# Patient Record
Sex: MALE | Race: WHITE | NOT HISPANIC OR LATINO | URBAN - METROPOLITAN AREA
[De-identification: names, ages, dates, MRNs, and addresses within clinical notes are randomized per-mention and may not be internally consistent; named-entity substitution may affect disease eponyms.]

---

## 2021-03-20 ENCOUNTER — OUTPATIENT (OUTPATIENT)
Dept: OUTPATIENT SERVICES | Facility: HOSPITAL | Age: 41
LOS: 1 days | End: 2021-03-20

## 2021-03-20 ENCOUNTER — EMERGENCY (EMERGENCY)
Facility: HOSPITAL | Age: 41
LOS: 1 days | End: 2021-03-20
Payer: MEDICAID

## 2021-03-20 PROCEDURE — 93010 ELECTROCARDIOGRAM REPORT: CPT

## 2021-03-20 PROCEDURE — 99285 EMERGENCY DEPT VISIT HI MDM: CPT

## 2021-03-20 PROCEDURE — 71045 X-RAY EXAM CHEST 1 VIEW: CPT | Mod: 26

## 2021-03-20 PROCEDURE — 99234 HOSP IP/OBS SM DT SF/LOW 45: CPT

## 2021-03-20 PROCEDURE — 93975 VASCULAR STUDY: CPT | Mod: 26

## 2021-03-21 ENCOUNTER — OUTPATIENT (OUTPATIENT)
Dept: OUTPATIENT SERVICES | Facility: HOSPITAL | Age: 41
LOS: 1 days | End: 2021-03-21

## 2021-04-15 PROBLEM — Z00.00 ENCOUNTER FOR PREVENTIVE HEALTH EXAMINATION: Status: ACTIVE | Noted: 2021-04-15

## 2021-04-16 ENCOUNTER — APPOINTMENT (OUTPATIENT)
Dept: CARDIOLOGY | Facility: CLINIC | Age: 41
End: 2021-04-16
Payer: MEDICAID

## 2021-04-16 VITALS
OXYGEN SATURATION: 99 % | HEIGHT: 72 IN | SYSTOLIC BLOOD PRESSURE: 110 MMHG | BODY MASS INDEX: 42.66 KG/M2 | WEIGHT: 315 LBS | HEART RATE: 82 BPM | TEMPERATURE: 97.3 F | DIASTOLIC BLOOD PRESSURE: 82 MMHG

## 2021-04-16 DIAGNOSIS — Z82.49 FAMILY HISTORY OF ISCHEMIC HEART DISEASE AND OTHER DISEASES OF THE CIRCULATORY SYSTEM: ICD-10-CM

## 2021-04-16 DIAGNOSIS — Z87.898 PERSONAL HISTORY OF OTHER SPECIFIED CONDITIONS: ICD-10-CM

## 2021-04-16 DIAGNOSIS — Z78.9 OTHER SPECIFIED HEALTH STATUS: ICD-10-CM

## 2021-04-16 DIAGNOSIS — Z87.891 PERSONAL HISTORY OF NICOTINE DEPENDENCE: ICD-10-CM

## 2021-04-16 PROCEDURE — 99204 OFFICE O/P NEW MOD 45 MIN: CPT

## 2021-04-16 PROCEDURE — 99072 ADDL SUPL MATRL&STAF TM PHE: CPT

## 2021-04-17 PROBLEM — Z87.898 HISTORY OF MORBID OBESITY: Status: RESOLVED | Noted: 2021-04-17 | Resolved: 2021-04-17

## 2021-05-10 ENCOUNTER — APPOINTMENT (OUTPATIENT)
Dept: CARDIOLOGY | Facility: CLINIC | Age: 41
End: 2021-05-10
Payer: MEDICAID

## 2021-05-10 PROCEDURE — 99072 ADDL SUPL MATRL&STAF TM PHE: CPT

## 2021-05-10 PROCEDURE — 93306 TTE W/DOPPLER COMPLETE: CPT

## 2021-05-12 ENCOUNTER — APPOINTMENT (OUTPATIENT)
Dept: CARDIOLOGY | Facility: CLINIC | Age: 41
End: 2021-05-12
Payer: MEDICAID

## 2021-05-12 PROCEDURE — 93015 CV STRESS TEST SUPVJ I&R: CPT

## 2021-05-12 PROCEDURE — 99072 ADDL SUPL MATRL&STAF TM PHE: CPT

## 2021-11-05 ENCOUNTER — NON-APPOINTMENT (OUTPATIENT)
Age: 41
End: 2021-11-05

## 2021-11-05 ENCOUNTER — APPOINTMENT (OUTPATIENT)
Dept: CARDIOLOGY | Facility: CLINIC | Age: 41
End: 2021-11-05
Payer: MEDICAID

## 2021-11-05 VITALS
BODY MASS INDEX: 42.66 KG/M2 | SYSTOLIC BLOOD PRESSURE: 146 MMHG | TEMPERATURE: 97.1 F | HEART RATE: 74 BPM | WEIGHT: 315 LBS | OXYGEN SATURATION: 97 % | HEIGHT: 72 IN | DIASTOLIC BLOOD PRESSURE: 98 MMHG

## 2021-11-05 PROCEDURE — 99214 OFFICE O/P EST MOD 30 MIN: CPT

## 2021-11-05 PROCEDURE — 93000 ELECTROCARDIOGRAM COMPLETE: CPT

## 2021-11-06 ENCOUNTER — APPOINTMENT (OUTPATIENT)
Dept: ULTRASOUND IMAGING | Facility: CLINIC | Age: 41
End: 2021-11-06
Payer: MEDICAID

## 2021-11-06 PROCEDURE — 76770 US EXAM ABDO BACK WALL COMP: CPT

## 2022-02-20 ENCOUNTER — NON-APPOINTMENT (OUTPATIENT)
Age: 42
End: 2022-02-20

## 2022-05-08 ENCOUNTER — RX CHANGE (OUTPATIENT)
Age: 42
End: 2022-05-08

## 2022-05-10 ENCOUNTER — NON-APPOINTMENT (OUTPATIENT)
Age: 42
End: 2022-05-10

## 2022-05-10 ENCOUNTER — APPOINTMENT (OUTPATIENT)
Dept: CARDIOLOGY | Facility: CLINIC | Age: 42
End: 2022-05-10
Payer: MEDICAID

## 2022-05-10 VITALS
WEIGHT: 315 LBS | DIASTOLIC BLOOD PRESSURE: 90 MMHG | BODY MASS INDEX: 54.25 KG/M2 | SYSTOLIC BLOOD PRESSURE: 134 MMHG | TEMPERATURE: 97.6 F | HEART RATE: 87 BPM | OXYGEN SATURATION: 96 %

## 2022-05-10 PROCEDURE — 93000 ELECTROCARDIOGRAM COMPLETE: CPT

## 2022-05-10 PROCEDURE — 99214 OFFICE O/P EST MOD 30 MIN: CPT

## 2022-05-10 NOTE — CARDIOLOGY SUMMARY
[de-identified] : 11/5/21: Poor R wave progression\par 5/10/22: SR, WNL [de-identified] : 5/21: Danny 5:52, SVT at peak HR, no ischemia.  [de-identified] : 5/10/21: E 60%, minimal MR, mild LVH, mild TR, minimal PI, normal PASP

## 2022-05-10 NOTE — DISCUSSION/SUMMARY
[With Me] : with me [___ Month(s)] : in [unfilled] month(s) [FreeTextEntry1] : 42-year-old male with past medical history as above presents for routine follow-up. \par \par 1.  Hypertension-continue valsartan 40 mg p.o. daily and Toprol XL 25 mg p.o. daily.  I offered him EP consultation for consideration of ablation of the SVT, he would like to wait at this time.\par 2.  Hyperlipidemia- on last check, advised continue lifestyle modifications with diet and weight loss.\par 3.  I counseled him on the safety and efficacy of the COVID-19 vaccine.  He is hesitant to get it.\par \par RTC 6 months

## 2022-05-10 NOTE — HISTORY OF PRESENT ILLNESS
[FreeTextEntry1] : 42-year-old male with past medical history of hypertension, hyperlipidemia and obesity presents for evaluation regarding recent hospitalization for uncontrolled hypertension and chest pain.  He is a former smoker and denies toxic habits.  He is a poor diet and frequently eats very fatty foods.  He had noticed that for several days prior to hospital visit that he had been having undulating pressures that were spiking.  He had seen his primary care doctor in [facility] with similar complaints and had lab work done.  Due to the chest pains he eventually came to the [facility] and was evaluated.  BPs were seemingly controlled with valsartan 160 mg p.o. daily.  He ruled out for acute MI and had no significant renal artery stenosis on an ultrasound.  Chest pain is centrally located, rated 3-5 out of 10, nonradiating without clear exacerbating or alleviating factors.\par \par 5/10/22:\par Feeling well.  Intermittently taking the metoprolol as needed for palps.  Denies chest pains, SOB and LE edema.

## 2022-05-10 NOTE — PHYSICAL EXAM

## 2022-06-06 ENCOUNTER — RX CHANGE (OUTPATIENT)
Age: 42
End: 2022-06-06

## 2022-06-27 ENCOUNTER — NON-APPOINTMENT (OUTPATIENT)
Age: 42
End: 2022-06-27

## 2022-07-11 ENCOUNTER — OUTPATIENT (OUTPATIENT)
Dept: OUTPATIENT SERVICES | Facility: HOSPITAL | Age: 42
LOS: 1 days | End: 2022-07-11

## 2022-07-11 DIAGNOSIS — J18.8 OTHER PNEUMONIA, UNSPECIFIED ORGANISM: ICD-10-CM

## 2022-07-11 PROCEDURE — 71046 X-RAY EXAM CHEST 2 VIEWS: CPT | Mod: 26

## 2022-07-20 ENCOUNTER — APPOINTMENT (OUTPATIENT)
Dept: ULTRASOUND IMAGING | Facility: CLINIC | Age: 42
End: 2022-07-20

## 2022-07-20 PROCEDURE — 76770 US EXAM ABDO BACK WALL COMP: CPT

## 2022-07-21 ENCOUNTER — APPOINTMENT (OUTPATIENT)
Dept: RADIOLOGY | Facility: CLINIC | Age: 42
End: 2022-07-21

## 2022-07-22 ENCOUNTER — RX CHANGE (OUTPATIENT)
Age: 42
End: 2022-07-22

## 2022-09-22 ENCOUNTER — RX CHANGE (OUTPATIENT)
Age: 42
End: 2022-09-22

## 2022-09-23 ENCOUNTER — RX CHANGE (OUTPATIENT)
Age: 42
End: 2022-09-23

## 2022-11-05 ENCOUNTER — RX CHANGE (OUTPATIENT)
Age: 42
End: 2022-11-05

## 2022-11-14 ENCOUNTER — APPOINTMENT (OUTPATIENT)
Dept: CARDIOLOGY | Facility: CLINIC | Age: 42
End: 2022-11-14

## 2023-02-21 ENCOUNTER — APPOINTMENT (OUTPATIENT)
Dept: CARDIOLOGY | Facility: CLINIC | Age: 43
End: 2023-02-21
Payer: MEDICAID

## 2023-02-21 ENCOUNTER — NON-APPOINTMENT (OUTPATIENT)
Age: 43
End: 2023-02-21

## 2023-02-21 VITALS
SYSTOLIC BLOOD PRESSURE: 164 MMHG | BODY MASS INDEX: 42.66 KG/M2 | TEMPERATURE: 97.1 F | WEIGHT: 315 LBS | DIASTOLIC BLOOD PRESSURE: 86 MMHG | HEART RATE: 84 BPM | OXYGEN SATURATION: 98 % | HEIGHT: 72 IN

## 2023-02-21 PROCEDURE — 99214 OFFICE O/P EST MOD 30 MIN: CPT | Mod: 25

## 2023-02-21 PROCEDURE — 93000 ELECTROCARDIOGRAM COMPLETE: CPT

## 2023-02-27 ENCOUNTER — OFFICE (OUTPATIENT)
Dept: URBAN - METROPOLITAN AREA CLINIC 38 | Facility: CLINIC | Age: 43
Setting detail: OPHTHALMOLOGY
End: 2023-02-27
Payer: MEDICAID

## 2023-02-27 DIAGNOSIS — H35.033: ICD-10-CM

## 2023-02-27 DIAGNOSIS — H01.004: ICD-10-CM

## 2023-02-27 DIAGNOSIS — H43.813: ICD-10-CM

## 2023-02-27 DIAGNOSIS — H11.153: ICD-10-CM

## 2023-02-27 DIAGNOSIS — H10.89: ICD-10-CM

## 2023-02-27 DIAGNOSIS — H01.001: ICD-10-CM

## 2023-02-27 DIAGNOSIS — H35.361: ICD-10-CM

## 2023-02-27 DIAGNOSIS — H25.13: ICD-10-CM

## 2023-02-27 PROCEDURE — 92002 INTRM OPH EXAM NEW PATIENT: CPT | Performed by: OPHTHALMOLOGY

## 2023-02-27 ASSESSMENT — LID EXAM ASSESSMENTS
OS_BLEPHARITIS: 2+ 3+
OD_BLEPHARITIS: 2+ 3+
OD_COMMENTS: WITH DEMODEX
OS_COMMENTS: WITH DEMODEX

## 2023-02-27 ASSESSMENT — KERATOMETRY
OD_K1POWER_DIOPTERS: 39.75
OS_AXISANGLE_DEGREES: 079
OS_K2POWER_DIOPTERS: 41.50
METHOD_AUTO_MANUAL: AUTO
OS_K1POWER_DIOPTERS: 40.25
OD_AXISANGLE_DEGREES: 111
OD_K2POWER_DIOPTERS: 42.50

## 2023-02-27 ASSESSMENT — REFRACTION_AUTOREFRACTION
OD_CYLINDER: -2.75
OS_CYLINDER: -0.50
OS_SPHERE: PLANO
OS_AXIS: 162
OD_AXIS: 029
OD_SPHERE: -0.25

## 2023-02-27 ASSESSMENT — TEAR BREAK UP TIME (TBUT)
OD_TBUT: 2-3 SECS
OS_TBUT: 2-3 SECS

## 2023-02-27 ASSESSMENT — CONFRONTATIONAL VISUAL FIELD TEST (CVF)
OS_FINDINGS: FULL
OD_FINDINGS: FULL

## 2023-02-27 ASSESSMENT — SPHEQUIV_DERIVED: OD_SPHEQUIV: -1.625

## 2023-02-27 ASSESSMENT — VISUAL ACUITY
OS_BCVA: 20/20-2
OD_BCVA: 20/20-2

## 2023-02-27 ASSESSMENT — AXIALLENGTH_DERIVED: OD_AL: 25.2

## 2023-03-14 ENCOUNTER — OFFICE (OUTPATIENT)
Dept: URBAN - METROPOLITAN AREA CLINIC 38 | Facility: CLINIC | Age: 43
Setting detail: OPHTHALMOLOGY
End: 2023-03-14
Payer: MEDICAID

## 2023-03-14 ENCOUNTER — RX ONLY (RX ONLY)
Age: 43
End: 2023-03-14

## 2023-03-14 DIAGNOSIS — H04.559: ICD-10-CM

## 2023-03-14 DIAGNOSIS — H16.223: ICD-10-CM

## 2023-03-14 DIAGNOSIS — H25.13: ICD-10-CM

## 2023-03-14 DIAGNOSIS — H01.001: ICD-10-CM

## 2023-03-14 DIAGNOSIS — H10.89: ICD-10-CM

## 2023-03-14 DIAGNOSIS — H01.004: ICD-10-CM

## 2023-03-14 DIAGNOSIS — H52.4: ICD-10-CM

## 2023-03-14 PROCEDURE — 99213 OFFICE O/P EST LOW 20 MIN: CPT | Performed by: OPHTHALMOLOGY

## 2023-03-14 PROCEDURE — 92015 DETERMINE REFRACTIVE STATE: CPT | Performed by: OPHTHALMOLOGY

## 2023-03-14 ASSESSMENT — KERATOMETRY
OS_AXISANGLE_DEGREES: 079
METHOD_AUTO_MANUAL: AUTO
OD_AXISANGLE_DEGREES: 116
OD_K2POWER_DIOPTERS: 42.25
OD_K1POWER_DIOPTERS: 39.75
OS_K1POWER_DIOPTERS: 40.25
OS_K2POWER_DIOPTERS: 41.50

## 2023-03-14 ASSESSMENT — LID EXAM ASSESSMENTS
OS_BLEPHARITIS: 1+
OD_COMMENTS: WITH DEMODEX
OD_BLEPHARITIS: 1+
OS_COMMENTS: WITH DEMODEX

## 2023-03-14 ASSESSMENT — REFRACTION_MANIFEST
OD_VA1: 20/25-2
OS_VA1: 20/20-2
OD_AXIS: 030
OS_ADD: +1.25
OU_VA: 20/20-2
OD_SPHERE: PLANO
OD_AXIS: 030
OS_SPHERE: PLANO
OS_SPHERE: PLANO
OD_CYLINDER: -2.50
OD_SPHERE: PLANO
OS_VA1: 20/20-2
OD_ADD: +1.25
OU_VA: 20/20-2
OD_VA1: 20/25-2
OD_CYLINDER: -2.50

## 2023-03-14 ASSESSMENT — REFRACTION_AUTOREFRACTION
OD_CYLINDER: -2.50
OS_SPHERE: PLANO
OD_AXIS: 033
OS_CYLINDER: -0.75
OD_SPHERE: -0.25
OS_AXIS: 165

## 2023-03-14 ASSESSMENT — TEAR BREAK UP TIME (TBUT)
OS_TBUT: 2-3 SECS
OD_TBUT: 2-3 SECS

## 2023-03-14 ASSESSMENT — CONFRONTATIONAL VISUAL FIELD TEST (CVF)
OS_FINDINGS: FULL
OD_FINDINGS: FULL

## 2023-03-14 ASSESSMENT — AXIALLENGTH_DERIVED: OD_AL: 25.1968

## 2023-03-14 ASSESSMENT — VISUAL ACUITY
OD_BCVA: 20/20-2
OS_BCVA: 20/60+2

## 2023-03-14 ASSESSMENT — TONOMETRY
OD_IOP_MMHG: 18
OS_IOP_MMHG: 17

## 2023-03-14 ASSESSMENT — SPHEQUIV_DERIVED: OD_SPHEQUIV: -1.5

## 2023-08-26 ENCOUNTER — APPOINTMENT (OUTPATIENT)
Dept: ULTRASOUND IMAGING | Facility: CLINIC | Age: 43
End: 2023-08-26
Payer: MEDICAID

## 2023-08-26 PROCEDURE — 93970 EXTREMITY STUDY: CPT

## 2023-08-26 PROCEDURE — 76775 US EXAM ABDO BACK WALL LIM: CPT

## 2023-08-28 ENCOUNTER — APPOINTMENT (OUTPATIENT)
Dept: RADIOLOGY | Facility: CLINIC | Age: 43
End: 2023-08-28
Payer: MEDICAID

## 2023-08-28 ENCOUNTER — NON-APPOINTMENT (OUTPATIENT)
Age: 43
End: 2023-08-28

## 2023-08-28 ENCOUNTER — APPOINTMENT (OUTPATIENT)
Dept: CARDIOLOGY | Facility: CLINIC | Age: 43
End: 2023-08-28
Payer: MEDICAID

## 2023-08-28 VITALS
HEART RATE: 92 BPM | OXYGEN SATURATION: 97 % | DIASTOLIC BLOOD PRESSURE: 100 MMHG | SYSTOLIC BLOOD PRESSURE: 150 MMHG | WEIGHT: 315 LBS | BODY MASS INDEX: 59.68 KG/M2

## 2023-08-28 DIAGNOSIS — R07.9 CHEST PAIN, UNSPECIFIED: ICD-10-CM

## 2023-08-28 PROCEDURE — 73560 X-RAY EXAM OF KNEE 1 OR 2: CPT | Mod: 50

## 2023-08-28 PROCEDURE — 93000 ELECTROCARDIOGRAM COMPLETE: CPT

## 2023-08-28 PROCEDURE — 99214 OFFICE O/P EST MOD 30 MIN: CPT | Mod: 25

## 2023-08-28 NOTE — DISCUSSION/SUMMARY
[With Me] : with me [___ Month(s)] : in [unfilled] month(s) [EKG obtained to assist in diagnosis and management of assessed problem(s)] : EKG obtained to assist in diagnosis and management of assessed problem(s) [FreeTextEntry1] : 43-year-old male with past medical history as above presents for routine follow-up.   1.  Hypertension-continue valsartan 40 mg p.o. daily.  I stressed compliance with medications. 2.  Hyperlipidemia-'s on last check, advised continue lifestyle modifications with diet and weight loss.  Refusing statin at this point.   RTC 6 months

## 2023-08-28 NOTE — CARDIOLOGY SUMMARY
[de-identified] : 11/5/21: Poor R wave progression 5/10/22: SR, WNL 2/21/2023: Sinus rhythm, poor R progression, otherwise unchanged from prior 8/28/23: SR, poor R wave progression. [de-identified] : 5/21: Danny 5:52, SVT at peak HR, no ischemia.  [de-identified] : 5/10/21: EF 60%, minimal MR, mild LVH, mild TR, minimal PI, normal PASP

## 2023-08-28 NOTE — PHYSICAL EXAM
[Well Developed] : well developed [Well Nourished] : well nourished [No Acute Distress] : no acute distress [Normal Conjunctiva] : normal conjunctiva [Normal Venous Pressure] : normal venous pressure [No Carotid Bruit] : no carotid bruit [Normal S1, S2] : normal S1, S2 [No Murmur] : no murmur [No Rub] : no rub [No Gallop] : no gallop [Clear Lung Fields] : clear lung fields [Good Air Entry] : good air entry [No Respiratory Distress] : no respiratory distress  [Soft] : abdomen soft [Non Tender] : non-tender [No Masses/organomegaly] : no masses/organomegaly [Normal Bowel Sounds] : normal bowel sounds [Normal Gait] : normal gait [No Edema] : no edema [No Cyanosis] : no cyanosis [No Clubbing] : no clubbing [No Varicosities] : no varicosities [No Skin Lesions] : no skin lesions [No Rash] : no rash [Moves all extremities] : moves all extremities [No Focal Deficits] : no focal deficits [Normal Speech] : normal speech [Alert and Oriented] : alert and oriented [Normal memory] : normal memory [de-identified] : Obese

## 2023-08-28 NOTE — HISTORY OF PRESENT ILLNESS
[FreeTextEntry1] : 42-year-old male with past medical history of hypertension, hyperlipidemia and obesity presents for evaluation regarding recent hospitalization for uncontrolled hypertension and chest pain.  He is a former smoker and denies toxic habits.  He is a poor diet and frequently eats very fatty foods.  He had noticed that for several days prior to hospital visit that he had been having undulating pressures that were spiking.  He had seen his primary care doctor in [facility] with similar complaints and had lab work done.  Due to the chest pains he eventually came to the [place] ER and was evaluated.  BPs were seemingly controlled with valsartan 160 mg p.o. daily.  He ruled out for acute MI and had no significant renal artery stenosis on an ultrasound.  Chest pain is centrally located, rated 3-5 out of 10, nonradiating without clear exacerbating or alleviating factors.  8/28/23: Has been non-compliant with BP meds as well as a 40lb weight gain.

## 2023-09-20 ENCOUNTER — NON-APPOINTMENT (OUTPATIENT)
Age: 43
End: 2023-09-20

## 2024-01-22 ENCOUNTER — APPOINTMENT (OUTPATIENT)
Dept: RADIOLOGY | Facility: CLINIC | Age: 44
End: 2024-01-22
Payer: MEDICAID

## 2024-01-22 PROCEDURE — 71046 X-RAY EXAM CHEST 2 VIEWS: CPT

## 2024-03-18 ENCOUNTER — NON-APPOINTMENT (OUTPATIENT)
Age: 44
End: 2024-03-18

## 2024-03-18 ENCOUNTER — APPOINTMENT (OUTPATIENT)
Dept: CARDIOLOGY | Facility: CLINIC | Age: 44
End: 2024-03-18
Payer: MEDICAID

## 2024-03-18 VITALS
DIASTOLIC BLOOD PRESSURE: 100 MMHG | BODY MASS INDEX: 61.57 KG/M2 | WEIGHT: 315 LBS | HEART RATE: 100 BPM | SYSTOLIC BLOOD PRESSURE: 154 MMHG | OXYGEN SATURATION: 96 %

## 2024-03-18 DIAGNOSIS — I47.10 SUPRAVENTRICULAR TACHYCARDIA, UNSPECIFIED: ICD-10-CM

## 2024-03-18 DIAGNOSIS — I10 ESSENTIAL (PRIMARY) HYPERTENSION: ICD-10-CM

## 2024-03-18 PROCEDURE — 93000 ELECTROCARDIOGRAM COMPLETE: CPT

## 2024-03-18 PROCEDURE — 99214 OFFICE O/P EST MOD 30 MIN: CPT

## 2024-03-18 PROCEDURE — G2211 COMPLEX E/M VISIT ADD ON: CPT | Mod: NC,1L

## 2024-03-18 RX ORDER — VALSARTAN 80 MG/1
80 TABLET, COATED ORAL DAILY
Qty: 90 | Refills: 3 | Status: ACTIVE | COMMUNITY
Start: 1900-01-01 | End: 1900-01-01

## 2024-03-18 RX ORDER — METOPROLOL SUCCINATE 25 MG/1
25 TABLET, EXTENDED RELEASE ORAL DAILY
Qty: 90 | Refills: 3 | Status: ACTIVE | COMMUNITY
Start: 2021-05-12 | End: 1900-01-01

## 2024-03-18 NOTE — DISCUSSION/SUMMARY
[With Me] : with me [___ Month(s)] : in [unfilled] month(s) [FreeTextEntry1] : 43-year-old male with past medical history as above presents for routine follow-up.  Having weight gain.  I offered CVP appt for GLP1's but he would like to try on his own again.  BP still poorly controlled.   1.  Hypertension - restart topol 25 mg PO daily and increase valsartan to 80 mg PO daily. 2.  Hyperlipidemia-'s on last check, advised continue lifestyle modifications with diet and weight loss.  Refusing statin at this point.   RTC 6 months [EKG obtained to assist in diagnosis and management of assessed problem(s)] : EKG obtained to assist in diagnosis and management of assessed problem(s)

## 2024-03-18 NOTE — CARDIOLOGY SUMMARY
[de-identified] : 11/5/21: Poor R wave progression 5/10/22: SR, WNL 2/21/2023: Sinus rhythm, poor R progression, otherwise unchanged from prior 8/28/23: SR, poor R wave progression. 3/18/2024: SR, poor R wave progression [de-identified] : 5/21: Danny 5:52, SVT at peak HR, no ischemia.  [de-identified] : 5/10/21: EF 60%, minimal MR, mild LVH, mild TR, minimal PI, normal PASP

## 2024-03-18 NOTE — HISTORY OF PRESENT ILLNESS
[FreeTextEntry1] : 43-year-old male with past medical history of hypertension, hyperlipidemia and obesity presents for evaluation regarding recent hospitalization for uncontrolled hypertension and chest pain.  He is a former smoker and denies toxic habits.  He is a poor diet and frequently eats very fatty foods.  He had noticed that for several days prior to hospital visit that he had been having undulating pressures that were spiking.  He had seen his primary care doctor in [facility] with similar complaints and had lab work done.  Due to the chest pains he eventually came to the [place] ER and was evaluated.  BPs were seemingly controlled with valsartan 160 mg p.o. daily.  He ruled out for acute MI and had no significant renal artery stenosis on an ultrasound.  Chest pain is centrally located, rated 3-5 out of 10, nonradiating without clear exacerbating or alleviating factors.  3/18/24: Compliant with meds, weight gain continues.  Reports palpitations.

## 2024-03-18 NOTE — PHYSICAL EXAM
[Well Nourished] : well nourished [Well Developed] : well developed [No Acute Distress] : no acute distress [Normal Conjunctiva] : normal conjunctiva [Normal Venous Pressure] : normal venous pressure [Normal S1, S2] : normal S1, S2 [No Carotid Bruit] : no carotid bruit [No Rub] : no rub [No Murmur] : no murmur [No Gallop] : no gallop [Good Air Entry] : good air entry [Clear Lung Fields] : clear lung fields [No Respiratory Distress] : no respiratory distress  [Soft] : abdomen soft [Non Tender] : non-tender [No Masses/organomegaly] : no masses/organomegaly [Normal Bowel Sounds] : normal bowel sounds [Normal Gait] : normal gait [No Edema] : no edema [No Cyanosis] : no cyanosis [No Varicosities] : no varicosities [No Clubbing] : no clubbing [No Skin Lesions] : no skin lesions [No Rash] : no rash [Moves all extremities] : moves all extremities [No Focal Deficits] : no focal deficits [Normal Speech] : normal speech [Alert and Oriented] : alert and oriented [Normal memory] : normal memory [de-identified] : Obese

## 2024-09-23 ENCOUNTER — APPOINTMENT (OUTPATIENT)
Dept: CARDIOLOGY | Facility: CLINIC | Age: 44
End: 2024-09-23
Payer: MEDICAID

## 2024-09-23 ENCOUNTER — NON-APPOINTMENT (OUTPATIENT)
Age: 44
End: 2024-09-23

## 2024-09-23 VITALS
HEIGHT: 72 IN | BODY MASS INDEX: 42.66 KG/M2 | OXYGEN SATURATION: 98 % | HEART RATE: 93 BPM | SYSTOLIC BLOOD PRESSURE: 138 MMHG | DIASTOLIC BLOOD PRESSURE: 98 MMHG | WEIGHT: 315 LBS

## 2024-09-23 DIAGNOSIS — I10 ESSENTIAL (PRIMARY) HYPERTENSION: ICD-10-CM

## 2024-09-23 DIAGNOSIS — I47.10 SUPRAVENTRICULAR TACHYCARDIA, UNSPECIFIED: ICD-10-CM

## 2024-09-23 PROCEDURE — G2211 COMPLEX E/M VISIT ADD ON: CPT | Mod: NC

## 2024-09-23 PROCEDURE — 93000 ELECTROCARDIOGRAM COMPLETE: CPT

## 2024-09-23 PROCEDURE — 99214 OFFICE O/P EST MOD 30 MIN: CPT

## 2024-09-23 NOTE — PHYSICAL EXAM

## 2024-09-23 NOTE — HISTORY OF PRESENT ILLNESS
[FreeTextEntry1] : 44-year-old male with past medical history of hypertension, hyperlipidemia and obesity presents for evaluation regarding recent hospitalization for uncontrolled hypertension and chest pain.  He is a former smoker and denies toxic habits.  He is a poor diet and frequently eats very fatty foods.  He had noticed that for several days prior to hospital visit that he had been having undulating pressures that were spiking.  He had seen his primary care doctor in [facility] with similar complaints and had lab work done.  Due to the chest pains he eventually came to the [place] ER and was evaluated.  BPs were seemingly controlled with valsartan 160 mg p.o. daily.  He ruled out for acute MI and had no significant renal artery stenosis on an ultrasound.  Chest pain is centrally located, rated 3-5 out of 10, nonradiating without clear exacerbating or alleviating factors.  9/23/2024: Compliant with valsartan, using metoprolol PRN.  No further palps.  Denies anginal chest pains.  Has NGUYEN with excessive activity and LE edema with inactivity or sitting in a truck.  Weight about stable.

## 2024-09-23 NOTE — CARDIOLOGY SUMMARY
[de-identified] : 11/5/21: Poor R wave progression 5/10/22: SR, WNL 2/21/2023: Sinus rhythm, poor R progression, otherwise unchanged from prior 9/23/2024: SR, poor R wave progression.  8/28/23: SR, poor R wave progression. 3/18/2024: SR, poor R wave progression [de-identified] : 5/21: Danny 5:52, SVT at peak HR, no ischemia.  [de-identified] : 5/10/21: EF 60%, minimal MR, mild LVH, mild TR, minimal PI, normal PASP

## 2024-09-23 NOTE — DISCUSSION/SUMMARY
[With Me] : with me [___ Month(s)] : in [unfilled] month(s) [FreeTextEntry1] : 44-year-old male with past medical history as above presents for routine follow-up.  Having weight gain.  I offered CVP appt for GLP1's but he would like to try on his own again.  BP better controlled.  1.  Hypertension - restart topol 25 mg PO daily and continue valsartan 80 mg PO daily. 2.  Hyperlipidemia-'s on last check, advised continue lifestyle modifications with diet and weight loss.  Refusing statin at this point.   RTC 6 months [EKG obtained to assist in diagnosis and management of assessed problem(s)] : EKG obtained to assist in diagnosis and management of assessed problem(s)

## 2025-02-07 ENCOUNTER — APPOINTMENT (OUTPATIENT)
Dept: ULTRASOUND IMAGING | Facility: CLINIC | Age: 45
End: 2025-02-07
Payer: MEDICAID

## 2025-02-07 PROCEDURE — 76770 US EXAM ABDO BACK WALL COMP: CPT

## 2025-03-07 ENCOUNTER — RX RENEWAL (OUTPATIENT)
Age: 45
End: 2025-03-07

## 2025-04-18 ENCOUNTER — NON-APPOINTMENT (OUTPATIENT)
Age: 45
End: 2025-04-18

## 2025-04-18 ENCOUNTER — APPOINTMENT (OUTPATIENT)
Dept: CARDIOLOGY | Facility: CLINIC | Age: 45
End: 2025-04-18

## 2025-04-18 VITALS
HEART RATE: 93 BPM | DIASTOLIC BLOOD PRESSURE: 96 MMHG | RESPIRATION RATE: 16 BRPM | SYSTOLIC BLOOD PRESSURE: 156 MMHG | WEIGHT: 315 LBS | HEIGHT: 72 IN | OXYGEN SATURATION: 98 % | BODY MASS INDEX: 42.66 KG/M2

## 2025-04-18 DIAGNOSIS — I10 ESSENTIAL (PRIMARY) HYPERTENSION: ICD-10-CM

## 2025-04-18 DIAGNOSIS — I47.10 SUPRAVENTRICULAR TACHYCARDIA, UNSPECIFIED: ICD-10-CM

## 2025-04-18 PROCEDURE — 93000 ELECTROCARDIOGRAM COMPLETE: CPT

## 2025-04-18 PROCEDURE — 99214 OFFICE O/P EST MOD 30 MIN: CPT

## 2025-04-18 PROCEDURE — G2211 COMPLEX E/M VISIT ADD ON: CPT | Mod: NC
